# Patient Record
Sex: FEMALE | Race: WHITE | NOT HISPANIC OR LATINO | Employment: FULL TIME | URBAN - METROPOLITAN AREA
[De-identification: names, ages, dates, MRNs, and addresses within clinical notes are randomized per-mention and may not be internally consistent; named-entity substitution may affect disease eponyms.]

---

## 2020-09-23 ENCOUNTER — INITIAL CONSULT (OUTPATIENT)
Dept: INFECTIOUS DISEASES | Facility: CLINIC | Age: 50
End: 2020-09-23
Payer: COMMERCIAL

## 2020-09-23 VITALS
OXYGEN SATURATION: 100 % | HEIGHT: 62 IN | SYSTOLIC BLOOD PRESSURE: 113 MMHG | DIASTOLIC BLOOD PRESSURE: 73 MMHG | HEART RATE: 79 BPM | WEIGHT: 122 LBS | BODY MASS INDEX: 22.45 KG/M2 | TEMPERATURE: 97 F

## 2020-09-23 DIAGNOSIS — H69.93 DYSFUNCTION OF BOTH EUSTACHIAN TUBES: ICD-10-CM

## 2020-09-23 DIAGNOSIS — J30.2 SEASONAL ALLERGIC RHINITIS, UNSPECIFIED TRIGGER: Primary | ICD-10-CM

## 2020-09-23 DIAGNOSIS — R51.9 SINUS HEADACHE: ICD-10-CM

## 2020-09-23 PROCEDURE — 99244 PR OFFICE CONSULTATION,LEVEL IV: ICD-10-PCS | Mod: S$GLB,,, | Performed by: INTERNAL MEDICINE

## 2020-09-23 PROCEDURE — 99244 OFF/OP CNSLTJ NEW/EST MOD 40: CPT | Mod: S$GLB,,, | Performed by: INTERNAL MEDICINE

## 2020-09-23 RX ORDER — AZELASTINE 1 MG/ML
1 SPRAY, METERED NASAL 2 TIMES DAILY
Qty: 30 ML | Refills: 2 | Status: SHIPPED | OUTPATIENT
Start: 2020-09-23 | End: 2021-09-23

## 2020-09-23 NOTE — PATIENT INSTRUCTIONS
I would encourage use of astelin right now to reduce drainage and sore throat and eustachian tube dysfunction.  You can use astelin twice a day  Irrigate your sinuses at least twice a day to remove allergens  Let the nasacort run down the back of your throat so that it can run over eustachian tube openings  Sudafed and also reduce ear stuffiness and headache/facial pain    Get your flu shot at work.  I would also encourage you to get the Prevnar vaccine (pneumonia vaccine) and the pneumovax at least 6 weeks later.   Records from Dr. Delaney    If you develop fever, lots of green/yellow drainage or the pain becomes localized, call me

## 2020-09-23 NOTE — PROGRESS NOTES
"Subjective:      Reason for consult: chronic sinusitis    HPI: Margaret Marquez is a 50 y.o. female   For the past several years she has had chronic sinus infections(7-8). Treated with numerous(50) antibiotics and steroids. Element of allergies contributing to this.   Now sees a sinus specialist , Dr. Varela in [place], starting 2 years ago. 18 months ago, he aspirated from the sinuses and cultured Staph aureus.   Now she has Staph epidermidis culture 4/13/20, and normal shraddha on 8/13(no purulence identified on endoscopic exam on either occasion per ENT notes),  both of which were obtained from the maxillary sinus itself.  This year, she had 3 days of rocephin(in the spring) and once for sinus infection with tetracycline for Staph epi in 4/2020  Tetracycline 2 weeks ago, plus a steroid shot for impetigo, urgent care. Followed up with a dermatologist  Currently she uses nasacort and po antihistamine and no longer using astelin. She does not irrigate her sinuses.   She has had 2 sinus surgeries    She feels that 5-6 days ago, after the weather change, she began to feel run down, facial pain, no fever, minimal yellowish green nasal drainage and pain in her maxillary area, stuffy in ears and pressure between her eyes. Also has toothache on all upper teeth("they feel itchy").  She feels this is reflective of acute sinusitis.     Seen by Dr. Delaney in [place], but has not finished allergy shots due to travel and cost. Allergic to many things, environmental (inside and outside), no pets in the home  She had immune function work up and that was normal     Review of patient's allergies indicates:   Allergen Reactions    Codeine Nausea And Vomiting    Sulfa (sulfonamide antibiotics)      Facial swelling      Clindamycin      UPSET STOMACH     Past Medical History:   Diagnosis Date    Dental abscess 2018    Depression     GERD (gastroesophageal reflux disease)     Sinusitis, chronic      Past Surgical " "History:   Procedure Laterality Date    SINUS SURGERY  1996    SINUS SURGERY  2015    Dr. Buck    TUBAL LIGATION        Social History     Tobacco Use    Smoking status: Never Smoker    Smokeless tobacco: Never Used   Substance Use Topics    Alcohol use: No     History reviewed. No pertinent family history.    Pertinent medications noted:     Review of Systems    Ability to give history is: excellent  No chills, fever, sweats,   No change in vision, loss of vision or diplopia    sinus congestion, minimal purulent nasal discharge, post nasal drip, and facial pain  No pain in mouth but upper teeth are uncomfortable. with minimal discomfort in  throat.    No chest pain, palpitations, syncope  Mild cough, mostly after reclining. no sputum production, shortness of breath, dyspnea on exertion,    No nausea, vomiting, diarrhea  or focal abd pain.      Frontal and facial headaches, no dizziness, vertigo,   No anxiety, depression, substance abuse, sleep disturbance  No diabetes, thyroid, hypogonadal conditions  No bleeding, lymphadenopathy, anemia, malignancy, unusual bruising  No new rashes,           Objective:      Blood pressure 113/73, pulse 79, temperature 97.4 °F (36.3 °C), temperature source Temporal, height 5' 2" (1.575 m), weight 55.3 kg (122 lb), SpO2 100 %. Body mass index is 22.31 kg/m².  Physical Exam      General: Alert and attentive, cooperative and in no distress, but a little anxious    Eyes: Pupils equal, round, reactive to light, anicteric, EOMI    Neck: Supple, non-tender, no thyromegaly or masses    ENT: EAC patent, TM normal, nares patent, with non-edematous turbinates and nasal passages wide open with no purulence.  no oral lesions, teeth in good condition, no thrush, no pharyngitis.     Cardiovascular: Regular rate and rhythm, no murmurs, rubs, or gallop    Respiratory: Lungs clear without wheezes, rales, rubs or rhonci    Gastrointestinal:  Soft, bowel sounds normal,       Genitourinary:  "   Integumentary: Skin without rashes  Vascular: No peripheral edema or phlebitis, warm and well perfused  Musculoskeletal: Ambulates without difficulty, no acute arthritis, synovitis   Lymphatic: No cervical, axillary,LAD  Neurological: Normal LOC, cranial nerves, speech,, gait  Psychiatric: Normal mood, speech,  demeanor   Wound:  VAD:       General Labs reviewed:  Lab Results   Component Value Date    WBC 7.66 01/02/2016    RBC 4.83 01/02/2016    HGB 15.1 01/02/2016    HCT 43.9 01/02/2016    MCV 91 01/02/2016    MCH 31.3 (H) 01/02/2016    MCHC 34.4 01/02/2016    RDW 12.3 01/02/2016     01/02/2016    MPV 9.6 01/02/2016    GRAN 5.0 01/02/2016    GRAN 66.0 01/02/2016    LYMPH 2.0 01/02/2016    LYMPH 25.6 01/02/2016    MONO 0.6 01/02/2016    MONO 7.6 01/02/2016    EOS 0.1 01/02/2016    BASO 0.02 01/02/2016    EOSINOPHIL 0.8 01/02/2016    BASOPHIL 0.3 01/02/2016     CMP  Sodium   Date Value Ref Range Status   01/02/2016 142 136 - 145 mmol/L Final     Potassium   Date Value Ref Range Status   01/02/2016 3.9 3.5 - 5.1 mmol/L Final     Chloride   Date Value Ref Range Status   01/02/2016 101 95 - 110 mmol/L Final     CO2   Date Value Ref Range Status   01/02/2016 26 23 - 29 mmol/L Final     Glucose   Date Value Ref Range Status   01/02/2016 82 70 - 110 mg/dL Final     BUN, Bld   Date Value Ref Range Status   01/02/2016 13 7 - 18 mg/dL Final     Creatinine   Date Value Ref Range Status   01/02/2016 0.63 0.50 - 1.40 mg/dL Final     Calcium   Date Value Ref Range Status   01/02/2016 9.7 8.7 - 10.5 mg/dL Final     Total Protein   Date Value Ref Range Status   01/02/2016 8.6 (H) 6.0 - 8.4 g/dL Final     Albumin   Date Value Ref Range Status   01/02/2016 5.2 3.5 - 5.2 g/dL Final     Total Bilirubin   Date Value Ref Range Status   01/02/2016 0.6 0.1 - 1.0 mg/dL Final     Comment:     For infants and newborns, interpretation of results should be based  on gestational age, weight and in agreement with  clinical  observations.  Premature Infant recommended reference ranges:  Up to 24 hours.............<8.0 mg/dL  Up to 48 hours............<12.0 mg/dL  3-5 days..................<15.0 mg/dL  6-29 days.................<15.0 mg/dL       Alkaline Phosphatase   Date Value Ref Range Status   01/02/2016 54 38 - 145 U/L Final     AST ([place])   Date Value Ref Range Status   01/02/2016 24 14 - 36 U/L Final     ALT   Date Value Ref Range Status   01/02/2016 25 10 - 44 U/L Final     Anion Gap   Date Value Ref Range Status   01/02/2016 15 8 - 16 mmol/L Final     eGFR if    Date Value Ref Range Status   01/02/2016 >60 >60 mL/min/1.73 m^2 Final     eGFR if non    Date Value Ref Range Status   01/02/2016 >60 >60 mL/min/1.73 m^2 Final     Comment:     Calculation used to obtain the estimated glomerular filtration  rate (eGFR) is the CKD-EPI equation. Since race is unknown   in our information system, the eGFR values for   -American and Non--American patients are given   for each creatinine result.             Micro: reviewed available micro in Epic    Imaging Reviewed:          Assessment:       Problem List Items Addressed This Visit     None      Visit Diagnoses     Seasonal allergic rhinitis, unspecified trigger    -  Primary    Sinus headache        Dysfunction of both eustachian tubes               Plan:           Seasonal allergic rhinitis, unspecified trigger    Sinus headache    Dysfunction of both eustachian tubes    Other orders  -     azelastine (ASTELIN) 137 mcg (0.1 %) nasal spray; 1 spray (137 mcg total) by Nasal route 2 (two) times daily.  Dispense: 30 mL; Refill: 2    I explained that I believe she is more likely to respond to allergy meds than antibiotics. She appreciates that using less antibiotics is beneficial in regards to resistance and side effects. She has no physical exam evidence of infection. I do not believe her home regimen is adequate at this time  to control her symptoms. She has not been compliant with all of the elements of her allergy control plan nor her allergy desensitization prescription.  Discussed that there was no evidence of purulence on recent endoscopic exam and cultures of a sinus without purulence are not relevant.    REcommendations:  I would encourage use of astelin right now to reduce drainage and sore throat and eustachian tube dysfunction.  You can use astelin twice a day  Irrigate your sinuses at least twice a day to remove allergens  Let the nasacort run down the back of your throat so that it can run over eustachian tube openings  Sudafed can also reduce ear stuffiness and headache/facial pain    Get your flu shot at work.  I would also encourage you to get the Prevnar vaccine (pneumonia vaccine) and the pneumovax at least 6 weeks later.   Records from Dr. Castrejon    If you develop fever, lots of green/yellow drainage or the pain becomes localized, call me    This note was created using Dragon voice recognition software that occasionally misinterpreted phrases or words.

## 2021-05-10 ENCOUNTER — PATIENT MESSAGE (OUTPATIENT)
Dept: RESEARCH | Facility: HOSPITAL | Age: 51
End: 2021-05-10

## 2022-01-04 ENCOUNTER — OFFICE VISIT (OUTPATIENT)
Dept: INFECTIOUS DISEASES | Facility: CLINIC | Age: 52
End: 2022-01-04
Payer: COMMERCIAL

## 2022-01-04 VITALS
TEMPERATURE: 98 F | OXYGEN SATURATION: 97 % | BODY MASS INDEX: 23.04 KG/M2 | HEIGHT: 62 IN | DIASTOLIC BLOOD PRESSURE: 58 MMHG | WEIGHT: 125.19 LBS | HEART RATE: 76 BPM | SYSTOLIC BLOOD PRESSURE: 116 MMHG

## 2022-01-04 DIAGNOSIS — J30.2 SEASONAL ALLERGIC RHINITIS, UNSPECIFIED TRIGGER: Primary | ICD-10-CM

## 2022-01-04 PROCEDURE — 3078F DIAST BP <80 MM HG: CPT | Mod: S$GLB,,, | Performed by: STUDENT IN AN ORGANIZED HEALTH CARE EDUCATION/TRAINING PROGRAM

## 2022-01-04 PROCEDURE — 3008F PR BODY MASS INDEX (BMI) DOCUMENTED: ICD-10-PCS | Mod: S$GLB,,, | Performed by: STUDENT IN AN ORGANIZED HEALTH CARE EDUCATION/TRAINING PROGRAM

## 2022-01-04 PROCEDURE — 99214 OFFICE O/P EST MOD 30 MIN: CPT | Mod: S$GLB,,, | Performed by: STUDENT IN AN ORGANIZED HEALTH CARE EDUCATION/TRAINING PROGRAM

## 2022-01-04 PROCEDURE — 3008F BODY MASS INDEX DOCD: CPT | Mod: S$GLB,,, | Performed by: STUDENT IN AN ORGANIZED HEALTH CARE EDUCATION/TRAINING PROGRAM

## 2022-01-04 PROCEDURE — 3078F PR MOST RECENT DIASTOLIC BLOOD PRESSURE < 80 MM HG: ICD-10-PCS | Mod: S$GLB,,, | Performed by: STUDENT IN AN ORGANIZED HEALTH CARE EDUCATION/TRAINING PROGRAM

## 2022-01-04 PROCEDURE — 3074F SYST BP LT 130 MM HG: CPT | Mod: S$GLB,,, | Performed by: STUDENT IN AN ORGANIZED HEALTH CARE EDUCATION/TRAINING PROGRAM

## 2022-01-04 PROCEDURE — 99214 PR OFFICE/OUTPT VISIT, EST, LEVL IV, 30-39 MIN: ICD-10-PCS | Mod: S$GLB,,, | Performed by: STUDENT IN AN ORGANIZED HEALTH CARE EDUCATION/TRAINING PROGRAM

## 2022-01-04 PROCEDURE — 3074F PR MOST RECENT SYSTOLIC BLOOD PRESSURE < 130 MM HG: ICD-10-PCS | Mod: S$GLB,,, | Performed by: STUDENT IN AN ORGANIZED HEALTH CARE EDUCATION/TRAINING PROGRAM

## 2022-01-04 RX ORDER — BENZOCAINE .13; .15; .5; 2 G/100G; G/100G; G/100G; G/100G
2 GEL ORAL DAILY
Qty: 1 G | Refills: 2 | Status: SHIPPED | OUTPATIENT
Start: 2022-01-04 | End: 2022-02-03

## 2022-01-04 NOTE — PROGRESS NOTES
Subjective: Chronic sinusitis        Patient ID: Margaret Marquez is a 51 y.o. female.    Chief Complaint:: chronic sinusitis     HPI   51-year-old woman with history of GERD, and chronic sinusitis status post multiple courses of antibiotics.  Patient follows Dr. Varela in [place], for the last 3 years.  She has had serial sinus aspirates and drainage with prior cultures which grew Staph aureus, Staph epidermidis back in 2020.  She is known to our service last seen in September 2020.   Patient states she had 3 episodes of sinusitis in the last year; received Rocephin for 7 days which she noticed significant improvement.  Completed she is now a course of ciprofloxacin for last sinus culture which grew Proteus mirabilis, pansensitive.  She states she feels her ears are always field, postnasal drip, and persistent allergies.  She uses Afrin over-the-counter.  She reports persistent headache, sinus pressure, and variable mucus discharge on and off, most recent nonpurulent.  She denies fever, chills, nausea, vomit, cough, abdominal pain, dysuria, change in bowel movement, or skin rash.      Review of patient's allergies indicates:   Allergen Reactions    Codeine Nausea And Vomiting    Sulfa (sulfonamide antibiotics)      Facial swelling       Past Medical History:   Diagnosis Date    Dental abscess 2018    Depression     GERD (gastroesophageal reflux disease)     Sinusitis, chronic      Past Surgical History:   Procedure Laterality Date    SINUS SURGERY  1996    SINUS SURGERY  2015    Dr. Buck    TUBAL LIGATION       Social History     Tobacco Use    Smoking status: Never Smoker    Smokeless tobacco: Never Used   Substance Use Topics    Alcohol use: No        History reviewed. No pertinent family history.      Review of Systems   Constitutional: Negative for chills and fever.   HENT: Positive for postnasal drip, rhinorrhea, sinus pressure and sinus pain.    Respiratory: Negative for shortness of  "breath.    Cardiovascular: Negative for chest pain.   Gastrointestinal: Negative for abdominal pain, diarrhea and nausea.   Genitourinary: Negative for dysuria.   Musculoskeletal: Negative for back pain.   Skin: Negative for rash.   Neurological: Negative for headaches.           Objective:      Blood pressure (!) 116/58, pulse 76, temperature 98.4 °F (36.9 °C), height 5' 2" (1.575 m), weight 56.8 kg (125 lb 3.2 oz), SpO2 97 %. Body mass index is 22.9 kg/m².  Physical Exam  Constitutional:       Appearance: Normal appearance.   HENT:      Head: Normocephalic.      Right Ear: Tympanic membrane normal.      Left Ear: Tympanic membrane normal.      Ears:      Comments: Tympanic membranes clear, no evidence of acute inflammation     Nose: Nose normal.      Mouth/Throat:      Mouth: Mucous membranes are moist.      Pharynx: Oropharynx is clear.   Cardiovascular:      Rate and Rhythm: Normal rate and regular rhythm.      Pulses: Normal pulses.      Heart sounds: Normal heart sounds.   Pulmonary:      Effort: Pulmonary effort is normal.      Breath sounds: Normal breath sounds.   Abdominal:      General: Bowel sounds are normal.      Palpations: Abdomen is soft.      Tenderness: There is no guarding.   Musculoskeletal:         General: Normal range of motion.      Cervical back: Normal range of motion and neck supple.      Right lower leg: No edema.      Left lower leg: No edema.   Skin:     General: Skin is warm.      Findings: No rash.   Neurological:      General: No focal deficit present.      Mental Status: She is alert and oriented to person, place, and time.   Psychiatric:         Thought Content: Thought content normal.      Recent Diagnostics:  Sinus culture 12/15 grew Proteus mirabilis sensitive to quinolones, resistant to ampicillin and cefazolin. Report scanned and available on Media tab.     Assessment and Plan:         1. Chronic sinusitis, likely seasonal allergic   Patient has completed multiple courses of " oral and IV antibiotics.    Explained at length lower likelihood/benefit of prolonging antibiotic treatment at the moment   Encouraged to use intranasal steroids once a day for now   Referred back to Dr. Varela, ENT for for excision and tissue biopsy including bacterial fungal cultures and special stains   Follow-up in 1 month    This note was created using Dragon voice recognition software that occasionally misinterpreted phrases or words.

## 2022-03-15 ENCOUNTER — OFFICE VISIT (OUTPATIENT)
Dept: INFECTIOUS DISEASES | Facility: CLINIC | Age: 52
End: 2022-03-15
Payer: COMMERCIAL

## 2022-03-15 VITALS
TEMPERATURE: 98 F | SYSTOLIC BLOOD PRESSURE: 102 MMHG | HEART RATE: 85 BPM | WEIGHT: 125.38 LBS | OXYGEN SATURATION: 98 % | HEIGHT: 62 IN | DIASTOLIC BLOOD PRESSURE: 58 MMHG | BODY MASS INDEX: 23.07 KG/M2

## 2022-03-15 DIAGNOSIS — Z87.09 HISTORY OF SINUSITIS: ICD-10-CM

## 2022-03-15 PROCEDURE — 99214 PR OFFICE/OUTPT VISIT, EST, LEVL IV, 30-39 MIN: ICD-10-PCS | Mod: S$GLB,,, | Performed by: STUDENT IN AN ORGANIZED HEALTH CARE EDUCATION/TRAINING PROGRAM

## 2022-03-15 PROCEDURE — 99214 OFFICE O/P EST MOD 30 MIN: CPT | Mod: S$GLB,,, | Performed by: STUDENT IN AN ORGANIZED HEALTH CARE EDUCATION/TRAINING PROGRAM

## 2022-03-15 NOTE — PROGRESS NOTES
Subjective: Chronic sinusitis        Patient ID: Margaret Marquez is a 51 y.o. female.    Chief Complaint:: Follow-up    Follow-up  Pertinent negatives include no abdominal pain, chest pain, chills, fever, headaches, nausea or rash.      51-year-old woman with history of GERD, and chronic sinusitis status post multiple courses of antibiotics.  Patient follows Dr. Varela in [place], for the last 3 years.  She has had serial sinus aspirates and drainage with prior cultures which grew Staph aureus, Staph epidermidis back in 2020.  She is known to our service last seen in September 2020.   Patient states she had 3 episodes of sinusitis in the last year; received Rocephin for 7 days which she noticed significant improvement.  Completed she is now a course of ciprofloxacin for last sinus culture which grew Proteus mirabilis, pansensitive.  She states she feels her ears are always field, postnasal drip, and persistent allergies.  She uses Afrin over-the-counter.  She reports persistent headache, sinus pressure, and variable mucus discharge on and off, most recent nonpurulent.  She denies fever, chills, nausea, vomit, cough, abdominal pain, dysuria, change in bowel movement, or skin rash.    3/15/22:  Interim reviewed, patient is here for follow-up.  She saw Dr. Lopes in [place] in February.  Status post left nasal curettage on 02/17.  Pathology report reviewed, no evidence of active infection, negative for malignancy.  Evidence of degenerative debris, AFB and GMS stains negative.      Review of patient's allergies indicates:   Allergen Reactions    Codeine Nausea And Vomiting    Sulfa (sulfonamide antibiotics)      Facial swelling       Past Medical History:   Diagnosis Date    Dental abscess 2018    Depression     GERD (gastroesophageal reflux disease)     Sinusitis, chronic      Past Surgical History:   Procedure Laterality Date    SINUS SURGERY  1996    SINUS SURGERY  2015    Dr. Heber DOWNS  "LIGATION       Social History     Tobacco Use    Smoking status: Never Smoker    Smokeless tobacco: Never Used   Substance Use Topics    Alcohol use: No        History reviewed. No pertinent family history.      Review of Systems   Constitutional: Negative for chills and fever.   HENT: Positive for postnasal drip, rhinorrhea, sinus pressure and sinus pain.    Respiratory: Negative for shortness of breath.    Cardiovascular: Negative for chest pain.   Gastrointestinal: Negative for abdominal pain, diarrhea and nausea.   Genitourinary: Negative for dysuria.   Musculoskeletal: Negative for back pain.   Skin: Negative for rash.   Neurological: Negative for headaches.           Objective:      Blood pressure (!) 102/58, pulse 85, temperature 98.4 °F (36.9 °C), height 5' 2" (1.575 m), weight 56.9 kg (125 lb 6.4 oz), SpO2 98 %. Body mass index is 22.94 kg/m².  Physical Exam  Constitutional:       Appearance: Normal appearance.   HENT:      Head: Normocephalic.      Right Ear: Tympanic membrane normal.      Left Ear: Tympanic membrane normal.      Ears:      Comments: Tympanic membranes clear, no evidence of acute inflammation     Nose: Nose normal.      Mouth/Throat:      Mouth: Mucous membranes are moist.      Pharynx: Oropharynx is clear.   Cardiovascular:      Rate and Rhythm: Normal rate and regular rhythm.      Pulses: Normal pulses.      Heart sounds: Normal heart sounds.   Pulmonary:      Effort: Pulmonary effort is normal.      Breath sounds: Normal breath sounds.   Abdominal:      General: Bowel sounds are normal.      Palpations: Abdomen is soft.      Tenderness: There is no guarding.   Musculoskeletal:         General: Normal range of motion.      Cervical back: Normal range of motion and neck supple.      Right lower leg: No edema.      Left lower leg: No edema.   Skin:     General: Skin is warm.      Findings: No rash.   Neurological:      General: No focal deficit present.      Mental Status: She is alert " and oriented to person, place, and time.   Psychiatric:         Thought Content: Thought content normal.      Recent Diagnostics:  Sinus culture 12/15 grew Proteus mirabilis sensitive to quinolones, resistant to ampicillin and cefazolin. Report scanned and available on Media tab.    Pathology from 2/23 available in Media - no evidence of infection or malignancy.      Assessment and Plan:         1. Chronic sinusitis, likely seasonal allergic, resolved after ENT curettage - improved    Continue supportive care; intranasal is a steroids, saline   All questions answered   Follow-up as needed     2. Bp 102/58   Patient complaining of persistent low diastolic BP, she will follow up with PCP in 2 weeks.      This note was created using Dragon voice recognition software that occasionally misinterpreted phrases or words.

## 2024-08-04 ENCOUNTER — PATIENT MESSAGE (OUTPATIENT)
Dept: INFECTIOUS DISEASES | Facility: CLINIC | Age: 54
End: 2024-08-04
Payer: COMMERCIAL

## 2024-08-21 ENCOUNTER — LAB VISIT (OUTPATIENT)
Dept: LAB | Facility: HOSPITAL | Age: 54
End: 2024-08-21
Attending: STUDENT IN AN ORGANIZED HEALTH CARE EDUCATION/TRAINING PROGRAM
Payer: COMMERCIAL

## 2024-08-21 ENCOUNTER — OFFICE VISIT (OUTPATIENT)
Dept: INFECTIOUS DISEASES | Facility: CLINIC | Age: 54
End: 2024-08-21
Payer: COMMERCIAL

## 2024-08-21 VITALS
DIASTOLIC BLOOD PRESSURE: 72 MMHG | TEMPERATURE: 97 F | BODY MASS INDEX: 23.96 KG/M2 | WEIGHT: 130.19 LBS | SYSTOLIC BLOOD PRESSURE: 106 MMHG | HEART RATE: 82 BPM | HEIGHT: 62 IN

## 2024-08-21 DIAGNOSIS — J32.1 CHRONIC FRONTAL SINUSITIS: ICD-10-CM

## 2024-08-21 DIAGNOSIS — J34.89 OTHER SPECIFIED DISORDERS OF NOSE AND NASAL SINUSES: ICD-10-CM

## 2024-08-21 DIAGNOSIS — Z87.09 HISTORY OF SINUSITIS: ICD-10-CM

## 2024-08-21 DIAGNOSIS — Z87.09 HISTORY OF SINUSITIS: Primary | ICD-10-CM

## 2024-08-21 LAB
ALBUMIN SERPL BCP-MCNC: 4.8 G/DL (ref 3.5–5.2)
ALP SERPL-CCNC: 40 U/L (ref 55–135)
ALT SERPL W/O P-5'-P-CCNC: 15 U/L (ref 10–44)
ANION GAP SERPL CALC-SCNC: 7 MMOL/L (ref 8–16)
AST SERPL-CCNC: 17 U/L (ref 10–40)
BASOPHILS # BLD AUTO: 0.03 K/UL (ref 0–0.2)
BASOPHILS NFR BLD: 0.4 % (ref 0–1.9)
BILIRUB SERPL-MCNC: 0.3 MG/DL (ref 0.1–1)
BUN SERPL-MCNC: 11 MG/DL (ref 6–20)
CALCIUM SERPL-MCNC: 8.9 MG/DL (ref 8.7–10.5)
CHLORIDE SERPL-SCNC: 103 MMOL/L (ref 95–110)
CO2 SERPL-SCNC: 27 MMOL/L (ref 23–29)
CREAT SERPL-MCNC: 0.7 MG/DL (ref 0.5–1.4)
CRP SERPL-MCNC: 0.1 MG/DL
DIFFERENTIAL METHOD BLD: ABNORMAL
EOSINOPHIL # BLD AUTO: 0.2 K/UL (ref 0–0.5)
EOSINOPHIL NFR BLD: 2.5 % (ref 0–8)
ERYTHROCYTE [DISTWIDTH] IN BLOOD BY AUTOMATED COUNT: 12.3 % (ref 11.5–14.5)
ERYTHROCYTE [SEDIMENTATION RATE] IN BLOOD BY WESTERGREN METHOD: 1 MM/HR (ref 0–20)
EST. GFR  (NO RACE VARIABLE): >60 ML/MIN/1.73 M^2
GLUCOSE SERPL-MCNC: 80 MG/DL (ref 70–110)
HCT VFR BLD AUTO: 40.9 % (ref 37–48.5)
HGB BLD-MCNC: 13.7 G/DL (ref 12–16)
IMM GRANULOCYTES # BLD AUTO: 0.01 K/UL (ref 0–0.04)
IMM GRANULOCYTES NFR BLD AUTO: 0.1 % (ref 0–0.5)
LYMPHOCYTES # BLD AUTO: 2.3 K/UL (ref 1–4.8)
LYMPHOCYTES NFR BLD: 31.6 % (ref 18–48)
MCH RBC QN AUTO: 31.4 PG (ref 27–31)
MCHC RBC AUTO-ENTMCNC: 33.5 G/DL (ref 32–36)
MCV RBC AUTO: 94 FL (ref 82–98)
MONOCYTES # BLD AUTO: 0.8 K/UL (ref 0.3–1)
MONOCYTES NFR BLD: 11.6 % (ref 4–15)
NEUTROPHILS # BLD AUTO: 3.9 K/UL (ref 1.8–7.7)
NEUTROPHILS NFR BLD: 53.8 % (ref 38–73)
NRBC BLD-RTO: 0 /100 WBC
PLATELET # BLD AUTO: 254 K/UL (ref 150–450)
PMV BLD AUTO: 9.2 FL (ref 9.2–12.9)
POTASSIUM SERPL-SCNC: 3.8 MMOL/L (ref 3.5–5.1)
PROT SERPL-MCNC: 7.6 G/DL (ref 6–8.4)
RBC # BLD AUTO: 4.37 M/UL (ref 4–5.4)
SODIUM SERPL-SCNC: 137 MMOL/L (ref 136–145)
WBC # BLD AUTO: 7.25 K/UL (ref 3.9–12.7)

## 2024-08-21 PROCEDURE — 3074F SYST BP LT 130 MM HG: CPT | Mod: CPTII,S$GLB,, | Performed by: STUDENT IN AN ORGANIZED HEALTH CARE EDUCATION/TRAINING PROGRAM

## 2024-08-21 PROCEDURE — 85651 RBC SED RATE NONAUTOMATED: CPT | Performed by: STUDENT IN AN ORGANIZED HEALTH CARE EDUCATION/TRAINING PROGRAM

## 2024-08-21 PROCEDURE — 3008F BODY MASS INDEX DOCD: CPT | Mod: CPTII,S$GLB,, | Performed by: STUDENT IN AN ORGANIZED HEALTH CARE EDUCATION/TRAINING PROGRAM

## 2024-08-21 PROCEDURE — 85025 COMPLETE CBC W/AUTO DIFF WBC: CPT | Performed by: STUDENT IN AN ORGANIZED HEALTH CARE EDUCATION/TRAINING PROGRAM

## 2024-08-21 PROCEDURE — 36415 COLL VENOUS BLD VENIPUNCTURE: CPT | Performed by: STUDENT IN AN ORGANIZED HEALTH CARE EDUCATION/TRAINING PROGRAM

## 2024-08-21 PROCEDURE — 1159F MED LIST DOCD IN RCRD: CPT | Mod: CPTII,S$GLB,, | Performed by: STUDENT IN AN ORGANIZED HEALTH CARE EDUCATION/TRAINING PROGRAM

## 2024-08-21 PROCEDURE — 3078F DIAST BP <80 MM HG: CPT | Mod: CPTII,S$GLB,, | Performed by: STUDENT IN AN ORGANIZED HEALTH CARE EDUCATION/TRAINING PROGRAM

## 2024-08-21 PROCEDURE — 86140 C-REACTIVE PROTEIN: CPT | Performed by: STUDENT IN AN ORGANIZED HEALTH CARE EDUCATION/TRAINING PROGRAM

## 2024-08-21 PROCEDURE — 99214 OFFICE O/P EST MOD 30 MIN: CPT | Mod: S$GLB,,, | Performed by: STUDENT IN AN ORGANIZED HEALTH CARE EDUCATION/TRAINING PROGRAM

## 2024-08-21 PROCEDURE — 80053 COMPREHEN METABOLIC PANEL: CPT | Performed by: STUDENT IN AN ORGANIZED HEALTH CARE EDUCATION/TRAINING PROGRAM

## 2024-08-21 NOTE — PROGRESS NOTES
Chronic sinusitis        Patient ID: Margaret Marquez is a 54 y.o. female.    Chief Complaint:: possible sinus infection     Follow-up  Associated symptoms include coughing, fatigue, headaches and weakness. Pertinent negatives include no abdominal pain, chest pain, chills, fever, nausea or rash.      54-year-old woman with history of GERD, and chronic sinusitis status post multiple courses of antibiotics.  Patient follows Dr. Varela in [place], for the last 3 years.  She has had serial sinus aspirates and drainage with prior cultures which grew Staph aureus, Staph epidermidis back in 2020.  She is known to our service last seen in September 2020.   Patient states she had 3 episodes of sinusitis in the last year; received Rocephin for 7 days which she noticed significant improvement.  Completed she is now a course of ciprofloxacin for last sinus culture which grew Proteus mirabilis, pansensitive.  She states she feels her ears are always field, postnasal drip, and persistent allergies.  She uses Afrin over-the-counter.  She reports persistent headache, sinus pressure, and variable mucus discharge on and off, most recent nonpurulent.  She denies fever, chills, nausea, vomit, cough, abdominal pain, dysuria, change in bowel movement, or skin rash.    3/15/22:  Interim reviewed, patient is here for follow-up.  She saw Dr. Varela in [place] in February.  Status post left nasal curettage on 02/17.  Pathology report reviewed, no evidence of active infection, negative for malignancy.  Evidence of degenerative debris, AFB and GMS stains negative.    8/21: Patient is here for follow up, she called the office to schedule an appt because she is very concerned she might have recurrent sinus infection. She has been seen multiple times by ENT in [place], and has received ceftriaxone 4 times in the last 11 months for 5 days each. She continuous to have headache, post nasal discharge and feeling her sinuses filled  with fluid in addition to dry cough.  She denies fever, chills, or night sweats. No nausea or vomit. No chest pain or abdominal pain, no other symptoms.  Discussed with patient at length pros and cons of being on antibiotics on and off without clear evidence of infection, high-risk of C diff and unlikely contributing to antibiotic resistance.  She understands, she mentioned she had a Gram stain positive for GPCs, cultures no growth.  Explained this is stain that the text bacteria but if there was no growth on cultures there was no evidence of infection.  She mentioned she was offered a special surgery to no gout and nerve and decrease the amount of secretions, she will contact her ENT and discuss further.  For now, we will get a CT sinuses with IV contrast to rule out acute pathology and assess status of sinuses.  She will see ENT, explained at length and ask to contact the office once cultures are taken so we can follow and guide therapy if needed.      Review of patient's allergies indicates:   Allergen Reactions    Codeine Nausea And Vomiting    Sulfa (sulfonamide antibiotics)      Facial swelling       Past Medical History:   Diagnosis Date    Dental abscess 2018    Depression     GERD (gastroesophageal reflux disease)     Sinusitis, chronic      Past Surgical History:   Procedure Laterality Date    SINUS SURGERY  1996    SINUS SURGERY  2015    Dr. Buck    TUBAL LIGATION       Social History     Tobacco Use    Smoking status: Never    Smokeless tobacco: Never   Substance Use Topics    Alcohol use: No        No family history on file.      Review of Systems   Constitutional:  Positive for fatigue. Negative for chills and fever.   HENT:  Positive for postnasal drip, rhinorrhea, sinus pressure and sinus pain.    Respiratory:  Positive for cough. Negative for shortness of breath.    Cardiovascular:  Negative for chest pain.   Gastrointestinal:  Negative for abdominal pain, diarrhea and nausea.   Genitourinary:   "Negative for dysuria.   Musculoskeletal:  Negative for back pain.   Skin:  Negative for rash.   Neurological:  Positive for weakness and headaches.           Objective:      Blood pressure 106/72, pulse 82, temperature 97.4 °F (36.3 °C), height 5' 2" (1.575 m), weight 59.1 kg (130 lb 3.2 oz), SpO2 (P) 97%. Body mass index is 23.81 kg/m².  Physical Exam  Constitutional:       Appearance: Normal appearance.   HENT:      Head: Normocephalic.      Comments: Sinus pain frontal and ethmoidal bilaterally     Right Ear: Tympanic membrane normal.      Left Ear: Tympanic membrane normal.      Ears:      Comments: Tympanic membranes clear, no evidence of acute inflammation     Nose: Nose normal.      Mouth/Throat:      Mouth: Mucous membranes are moist.      Pharynx: Oropharynx is clear.   Cardiovascular:      Rate and Rhythm: Normal rate and regular rhythm.      Pulses: Normal pulses.      Heart sounds: Normal heart sounds.   Pulmonary:      Effort: Pulmonary effort is normal.      Breath sounds: Normal breath sounds.   Abdominal:      General: Bowel sounds are normal.      Palpations: Abdomen is soft.      Tenderness: There is no guarding.   Musculoskeletal:         General: Normal range of motion.      Cervical back: Normal range of motion and neck supple.      Right lower leg: No edema.      Left lower leg: No edema.   Skin:     General: Skin is warm.      Findings: No rash.   Neurological:      General: No focal deficit present.      Mental Status: She is alert and oriented to person, place, and time.   Psychiatric:         Thought Content: Thought content normal.      Recent Diagnostics:  Sinus culture 12/15 grew Proteus mirabilis sensitive to quinolones, resistant to ampicillin and cefazolin. Report scanned and available on Media tab.    Pathology from 2/23 available in [facility] - no evidence of infection or malignancy.      Assessment and Plan:         1. History of chronic sinusitis, rule out seasonal allergies, status " post Rocephin IM/IV x4 in the last 11 months - stable   Labs today   As per patient, no organisms have been identified   Will order CT sinuses with IV contrast to rule out acute pathology   Patient advised to contact ENT whenever she feels infection present for curettage and cultures in order to guide antibiotic therapy if there is evidence of infection   Continue supportive care; intranasal steroids, saline   All questions answered   RTC in 5 months       I spent a total of 30 minutes on the day of the visit.  This includes face to face time and non-face to face time preparing to see the patient (eg, review of tests), obtaining and/or reviewing separately obtained history, documenting clinical information in the electronic or other health record, independently interpreting results and communicating results to the patient/family/caregiver, or care coordinator.    This note was created using Dragon voice recognition software that occasionally misinterpreted phrases or words.

## 2024-08-29 ENCOUNTER — HOSPITAL ENCOUNTER (OUTPATIENT)
Dept: RADIOLOGY | Facility: HOSPITAL | Age: 54
Discharge: HOME | End: 2024-08-29
Attending: STUDENT IN AN ORGANIZED HEALTH CARE EDUCATION/TRAINING PROGRAM
Payer: COMMERCIAL

## 2024-08-29 DIAGNOSIS — J32.1 CHRONIC FRONTAL SINUSITIS: ICD-10-CM

## 2024-08-29 DIAGNOSIS — J34.89 OTHER SPECIFIED DISORDERS OF NOSE AND NASAL SINUSES: ICD-10-CM

## 2024-08-29 PROCEDURE — 70487 CT MAXILLOFACIAL W/DYE: CPT | Mod: 26,,, | Performed by: RADIOLOGY

## 2024-08-29 PROCEDURE — 70487 CT MAXILLOFACIAL W/DYE: CPT | Mod: TC,PO

## 2024-08-29 PROCEDURE — 25500020 PHARM REV CODE 255: Mod: PO | Performed by: STUDENT IN AN ORGANIZED HEALTH CARE EDUCATION/TRAINING PROGRAM

## 2024-08-29 RX ADMIN — IOHEXOL 100 ML: 350 INJECTION, SOLUTION INTRAVENOUS at 03:08

## 2025-03-26 ENCOUNTER — OFFICE VISIT (OUTPATIENT)
Dept: INFECTIOUS DISEASES | Facility: CLINIC | Age: 55
End: 2025-03-26
Payer: COMMERCIAL

## 2025-03-26 VITALS
TEMPERATURE: 97 F | HEIGHT: 62 IN | BODY MASS INDEX: 22.15 KG/M2 | DIASTOLIC BLOOD PRESSURE: 60 MMHG | WEIGHT: 120.38 LBS | OXYGEN SATURATION: 96 % | SYSTOLIC BLOOD PRESSURE: 100 MMHG

## 2025-03-26 DIAGNOSIS — Z87.09 HISTORY OF SINUSITIS: ICD-10-CM

## 2025-03-26 DIAGNOSIS — G43.801 OTHER MIGRAINE WITH STATUS MIGRAINOSUS, NOT INTRACTABLE: ICD-10-CM

## 2025-03-26 DIAGNOSIS — J32.1 CHRONIC FRONTAL SINUSITIS: Primary | ICD-10-CM

## 2025-03-26 PROCEDURE — 3074F SYST BP LT 130 MM HG: CPT | Mod: CPTII,S$GLB,, | Performed by: STUDENT IN AN ORGANIZED HEALTH CARE EDUCATION/TRAINING PROGRAM

## 2025-03-26 PROCEDURE — 3008F BODY MASS INDEX DOCD: CPT | Mod: CPTII,S$GLB,, | Performed by: STUDENT IN AN ORGANIZED HEALTH CARE EDUCATION/TRAINING PROGRAM

## 2025-03-26 PROCEDURE — 99214 OFFICE O/P EST MOD 30 MIN: CPT | Mod: S$GLB,,, | Performed by: STUDENT IN AN ORGANIZED HEALTH CARE EDUCATION/TRAINING PROGRAM

## 2025-03-26 PROCEDURE — 1159F MED LIST DOCD IN RCRD: CPT | Mod: CPTII,S$GLB,, | Performed by: STUDENT IN AN ORGANIZED HEALTH CARE EDUCATION/TRAINING PROGRAM

## 2025-03-26 PROCEDURE — 3078F DIAST BP <80 MM HG: CPT | Mod: CPTII,S$GLB,, | Performed by: STUDENT IN AN ORGANIZED HEALTH CARE EDUCATION/TRAINING PROGRAM

## 2025-03-26 RX ORDER — MOXIFLOXACIN HYDROCHLORIDE 400 MG/1
400 TABLET ORAL DAILY
Qty: 10 TABLET | Refills: 0 | Status: SHIPPED | OUTPATIENT
Start: 2025-03-26 | End: 2025-04-05

## 2025-03-26 RX ORDER — PREDNISONE 20 MG/1
20 TABLET ORAL DAILY
Qty: 3 TABLET | Refills: 0 | Status: SHIPPED | OUTPATIENT
Start: 2025-03-26 | End: 2025-03-29

## 2025-03-26 RX ORDER — CIPROFLOXACIN AND DEXAMETHASONE 3; 1 MG/ML; MG/ML
4 SUSPENSION/ DROPS AURICULAR (OTIC) 2 TIMES DAILY
Qty: 7.5 ML | Refills: 1 | Status: SHIPPED | OUTPATIENT
Start: 2025-03-26 | End: 2025-04-02

## 2025-03-26 RX ORDER — DOXYCYCLINE 100 MG/1
100 CAPSULE ORAL 2 TIMES DAILY
Qty: 20 CAPSULE | Refills: 0 | Status: SHIPPED | OUTPATIENT
Start: 2025-03-26 | End: 2025-04-05

## 2025-03-26 RX ORDER — SODIUM CHLORIDE FOR INHALATION 3 %
4 VIAL, NEBULIZER (ML) INHALATION 2 TIMES DAILY
Qty: 80 ML | Refills: 0 | Status: SHIPPED | OUTPATIENT
Start: 2025-03-26 | End: 2025-04-05

## 2025-03-26 NOTE — PATIENT INSTRUCTIONS
CT scan w/ and w/o contrast  Moxifloxacin 400mg PO daily  Doxycycline 100mg PO twice a day   Above for 10 days  Prednsione 20mg PO daily for 3 days  Hypertonic 3% nebulization BID  Labs ordered  ENT follow up for possible culture   Alarm symptoms given to the patient   RTC in 4 weeks

## 2025-03-26 NOTE — PROGRESS NOTES
Chronic sinusitis        Patient ID: Margaret Marquez is a 54 y.o. female.    Chief Complaint:: Follow-up    Follow-up  Associated symptoms include fatigue, headaches and weakness. Pertinent negatives include no abdominal pain, chest pain, chills, coughing, fever, nausea or rash.      54-year-old woman with history of GERD, and chronic sinusitis status post multiple courses of antibiotics.  Patient follows Dr. Varela in [place], for the last 3 years.  She has had serial sinus aspirates and drainage with prior cultures which grew Staph aureus, Staph epidermidis back in 2020.  She is known to our service last seen in September 2020.   Patient states she had 3 episodes of sinusitis in the last year; received Rocephin for 7 days which she noticed significant improvement.  Completed she is now a course of ciprofloxacin for last sinus culture which grew Proteus mirabilis, pansensitive.  She states she feels her ears are always field, postnasal drip, and persistent allergies.  She uses Afrin over-the-counter.  She reports persistent headache, sinus pressure, and variable mucus discharge on and off, most recent nonpurulent.  She denies fever, chills, nausea, vomit, cough, abdominal pain, dysuria, change in bowel movement, or skin rash.    3/15/22:  Interim reviewed, patient is here for follow-up.  She saw Dr. Varela in [place] in February.  Status post left nasal curettage on 02/17.  Pathology report reviewed, no evidence of active infection, negative for malignancy.  Evidence of degenerative debris, AFB and GMS stains negative.    8/21: Patient is here for follow up, she called the office to schedule an appt because she is very concerned she might have recurrent sinus infection. She has been seen multiple times by ENT in [place], and has received ceftriaxone 4 times in the last 11 months for 5 days each. She continuous to have headache, post nasal discharge and feeling her sinuses filled with fluid in  addition to dry cough.  She denies fever, chills, or night sweats. No nausea or vomit. No chest pain or abdominal pain, no other symptoms.  Discussed with patient at length pros and cons of being on antibiotics on and off without clear evidence of infection, high-risk of C diff and unlikely contributing to antibiotic resistance.  She understands, she mentioned she had a Gram stain positive for GPCs, cultures no growth.  Explained this is stain that the text bacteria but if there was no growth on cultures there was no evidence of infection.  She mentioned she was offered a special surgery to no gout and nerve and decrease the amount of secretions, she will contact her ENT and discuss further.  For now, we will get a CT sinuses with IV contrast to rule out acute pathology and assess status of sinuses.  She will see ENT, explained at length and ask to contact the office once cultures are taken so we can follow and guide therapy if needed.    3/26/2025:  Interim reviewed, patient is here for follow-up, she was last seen at the office August of 2024.  He mentions since last visit, she has been seen by allergy specialist, she is currently doing serum infusions once a week since November to enhance her immune system.  She recently saw neurology for worsening headache, she was given a dose of tramadol with partial improvement in symptoms.  She is very concerned she might be having a sinusitis flare due to worsening headache, she feels pressure on her frontal area, has postnasal drip, but she is not able to drain the mucus.  She also mentions she was treated with 6 days of ceftriaxone IM by allergy specialist earlier this year.  She was advised to seek Infectious Disease and ENT for further management.  She also states she went to see ENT, had scope performed and was told there was not purulent secretions and nothing concerning and no cultures were sent.  She complains of occasional night sweats at night, denies fever or  "chills.  She denies chest pain, no shortness of breath, very occasional cough, unable to bring anything up.  No abdominal pain, no dysuria increased urinary frequency, no changes in bowel movements.    Review of patient's allergies indicates:   Allergen Reactions    Codeine Nausea And Vomiting    Sulfa (sulfonamide antibiotics)      Facial swelling       Past Medical History:   Diagnosis Date    Dental abscess 2018    Depression     GERD (gastroesophageal reflux disease)     Sinusitis, chronic      Past Surgical History:   Procedure Laterality Date    SINUS SURGERY  1996    SINUS SURGERY  2015    Dr. Buck    TUBAL LIGATION       Social History     Tobacco Use    Smoking status: Never    Smokeless tobacco: Never   Substance Use Topics    Alcohol use: No        No family history on file.      Review of Systems   Constitutional:  Positive for fatigue. Negative for chills and fever.   HENT:  Positive for postnasal drip, rhinorrhea, sinus pressure and sinus pain.    Respiratory:  Negative for cough and shortness of breath.    Cardiovascular:  Negative for chest pain.   Gastrointestinal:  Negative for abdominal pain, diarrhea and nausea.   Genitourinary:  Negative for dysuria.   Musculoskeletal:  Negative for back pain.   Skin:  Negative for rash.   Neurological:  Positive for weakness and headaches.           Objective:      Blood pressure 100/60, pulse (P) 86, temperature 97.2 °F (36.2 °C), temperature source Temporal, height 5' 2" (1.575 m), weight 54.6 kg (120 lb 6.4 oz), SpO2 96%. Body mass index is 22.02 kg/m².  Physical Exam  Constitutional:       Appearance: Normal appearance.   HENT:      Head: Normocephalic.      Comments: Sinus pain frontal bilaterally     Right Ear: Tympanic membrane normal.      Left Ear: Tympanic membrane normal.      Ears:      Comments: Right ear with fluid noted on tympanic membrane with mild erythema consistent with otitis   Left ear with clear tympanic membrane, no signs of otitis     " Nose: Nose normal.      Comments: Exam with otoscope revealed right nostril with quiet mucosa, no evidence of drainage.  Left nostril with hyperemia, tender to touch, no evidence of purulent drainage     Mouth/Throat:      Mouth: Mucous membranes are moist.      Pharynx: Oropharynx is clear.   Cardiovascular:      Rate and Rhythm: Normal rate and regular rhythm.      Pulses: Normal pulses.      Heart sounds: Normal heart sounds.   Pulmonary:      Effort: Pulmonary effort is normal.      Breath sounds: Normal breath sounds.   Abdominal:      General: Bowel sounds are normal.      Palpations: Abdomen is soft.      Tenderness: There is no guarding.   Musculoskeletal:         General: Normal range of motion.      Cervical back: Normal range of motion and neck supple.      Right lower leg: No edema.      Left lower leg: No edema.   Skin:     General: Skin is warm.      Findings: No rash.   Neurological:      General: No focal deficit present.      Mental Status: She is alert and oriented to person, place, and time.   Psychiatric:         Thought Content: Thought content normal.      Recent Diagnostics:  Sinus culture 12/15 grew Proteus mirabilis sensitive to quinolones, resistant to ampicillin and cefazolin. Report scanned and available on Media tab.    Pathology from 2/23 available in [facility] - no evidence of infection or malignancy.      Assessment and Plan:         1. History of chronic sinusitis, rule out seasonal allergies, has been treated with Rocephin IM/IV in the last 18 months, rule out flare   Labs today    Last CT scan in August of 2024 had a 6 mm and 4 mm rounded polypoid intraluminal filling defect compatible with mucous retention cyst/mucocele vs polyp in the left maxillary sinus.  Right maxillary sinus 3 mm round polypoid intraluminal filling defect along the anterior medial right maxillary sinus wall suggestive of a polyp, or mucous retention cysts/mucocele.  S sphenoid sinuses with mucosal thickening  along the sphenoethmoid recesses measuring 1 mm in thickness.  Will order CT sinuses with IV contrast to rule out acute pathology   Patient advised to contact ENT whenever she feels infection present for curettage and send cultures in order to guide antibiotic therapy if there is evidence of infection   While awaiting evaluation by ENT, moxifloxacin 400 mg p.o. daily, doxycycline 100 mg p.o. twice a day for 7-10 days   Ciprodex right ear twice a day for 7 days  Prednisone 20 mg p.o. daily for 3 days next alarm symptoms given to the patient in case symptoms worsened do go directly to the emergency room   Continue supportive care; intranasal steroids, saline   All questions answered   RTC in 1 month      I spent a total of 30 minutes on the day of the visit.  This includes face to face time and non-face to face time preparing to see the patient (eg, review of tests), obtaining and/or reviewing separately obtained history, documenting clinical information in the electronic or other health record, independently interpreting results and communicating results to the patient/family/caregiver, or care coordinator.    This note was created using Dragon voice recognition software that occasionally misinterpreted phrases or words.

## 2025-03-27 ENCOUNTER — HOSPITAL ENCOUNTER (OUTPATIENT)
Dept: RADIOLOGY | Facility: HOSPITAL | Age: 55
Discharge: HOME | End: 2025-03-27
Attending: STUDENT IN AN ORGANIZED HEALTH CARE EDUCATION/TRAINING PROGRAM
Payer: COMMERCIAL

## 2025-03-27 DIAGNOSIS — J32.1 CHRONIC FRONTAL SINUSITIS: ICD-10-CM

## 2025-03-27 PROCEDURE — 70487 CT MAXILLOFACIAL W/DYE: CPT | Mod: 26,,, | Performed by: RADIOLOGY

## 2025-03-27 PROCEDURE — 25500020 PHARM REV CODE 255: Performed by: STUDENT IN AN ORGANIZED HEALTH CARE EDUCATION/TRAINING PROGRAM

## 2025-03-27 PROCEDURE — 70487 CT MAXILLOFACIAL W/DYE: CPT | Mod: TC

## 2025-03-27 RX ADMIN — IOHEXOL 100 ML: 350 INJECTION, SOLUTION INTRAVENOUS at 11:03

## 2025-03-30 ENCOUNTER — HOSPITAL ENCOUNTER (EMERGENCY)
Facility: HOSPITAL | Age: 55
Discharge: HOME | End: 2025-03-30
Attending: EMERGENCY MEDICINE
Payer: COMMERCIAL

## 2025-03-30 VITALS
HEART RATE: 75 BPM | OXYGEN SATURATION: 99 % | RESPIRATION RATE: 16 BRPM | DIASTOLIC BLOOD PRESSURE: 74 MMHG | TEMPERATURE: 98 F | SYSTOLIC BLOOD PRESSURE: 122 MMHG | HEIGHT: 62 IN | BODY MASS INDEX: 22.08 KG/M2 | WEIGHT: 120 LBS

## 2025-03-30 DIAGNOSIS — J32.9 SINUSITIS, UNSPECIFIED CHRONICITY, UNSPECIFIED LOCATION: ICD-10-CM

## 2025-03-30 DIAGNOSIS — R51.9 SINUS HEADACHE: Primary | ICD-10-CM

## 2025-03-30 PROBLEM — E78.5 HYPERLIPIDEMIA: Status: ACTIVE | Noted: 2024-01-10

## 2025-03-30 LAB
ABSOLUTE EOSINOPHIL (SMH): 0.08 K/UL
ABSOLUTE MONOCYTE (SMH): 0.67 K/UL (ref 0.3–1)
ABSOLUTE NEUTROPHIL COUNT (SMH): 2.1 K/UL (ref 1.8–7.7)
ALBUMIN SERPL-MCNC: 4.4 G/DL (ref 3.5–5.2)
ALP SERPL-CCNC: 45 UNIT/L (ref 55–135)
ALT SERPL-CCNC: 15 UNIT/L (ref 10–44)
ANION GAP (SMH): 8 MMOL/L (ref 8–16)
AST SERPL-CCNC: 15 UNIT/L (ref 10–40)
BASOPHILS # BLD AUTO: 0.03 K/UL
BASOPHILS NFR BLD AUTO: 0.6 %
BILIRUB SERPL-MCNC: 0.4 MG/DL (ref 0.1–1)
BILIRUB UR QL STRIP.AUTO: NEGATIVE
BUN SERPL-MCNC: 13 MG/DL (ref 6–20)
CALCIUM SERPL-MCNC: 9.2 MG/DL (ref 8.7–10.5)
CHLORIDE SERPL-SCNC: 105 MMOL/L (ref 95–110)
CK SERPL-CCNC: 61 U/L (ref 20–180)
CLARITY UR: CLEAR
CO2 SERPL-SCNC: 28 MMOL/L (ref 23–29)
COLOR UR AUTO: YELLOW
CREAT SERPL-MCNC: 0.9 MG/DL (ref 0.5–1.4)
ERYTHROCYTE [DISTWIDTH] IN BLOOD BY AUTOMATED COUNT: 12.4 % (ref 11.5–14.5)
GFR SERPLBLD CREATININE-BSD FMLA CKD-EPI: >60 ML/MIN/1.73/M2
GLUCOSE SERPL-MCNC: 80 MG/DL (ref 70–110)
GLUCOSE UR QL STRIP: NEGATIVE
HCT VFR BLD AUTO: 41.3 % (ref 37–48.5)
HGB BLD-MCNC: 13.9 GM/DL (ref 12–16)
HGB UR QL STRIP: NEGATIVE
IMM GRANULOCYTES # BLD AUTO: 0.01 K/UL (ref 0–0.04)
IMM GRANULOCYTES NFR BLD AUTO: 0.2 % (ref 0–0.5)
INFLUENZA A MOLECULAR (OHS): NEGATIVE
INFLUENZA B MOLECULAR (OHS): NEGATIVE
KETONES UR QL STRIP: NEGATIVE
LDH SERPL L TO P-CCNC: 0.88 MMOL/L (ref 0.5–2.2)
LEUKOCYTE ESTERASE UR QL STRIP: NEGATIVE
LYMPHOCYTES # BLD AUTO: 2.21 K/UL (ref 1–4.8)
MAGNESIUM SERPL-MCNC: 2.1 MG/DL (ref 1.6–2.6)
MCH RBC QN AUTO: 30.9 PG (ref 27–31)
MCHC RBC AUTO-ENTMCNC: 33.7 G/DL (ref 32–36)
MCV RBC AUTO: 92 FL (ref 82–98)
NITRITE UR QL STRIP: NEGATIVE
NUCLEATED RBC (/100WBC) (SMH): 0 /100 WBC
PH UR STRIP: 6 [PH]
PLATELET # BLD AUTO: 324 K/UL (ref 150–450)
PMV BLD AUTO: 8.8 FL (ref 9.2–12.9)
POTASSIUM SERPL-SCNC: 3.7 MMOL/L (ref 3.5–5.1)
PROT SERPL-MCNC: 7 GM/DL (ref 6–8.4)
PROT UR QL STRIP: NEGATIVE
RBC # BLD AUTO: 4.5 M/UL (ref 4–5.4)
RELATIVE EOSINOPHIL (SMH): 1.6 % (ref 0–8)
RELATIVE LYMPHOCYTE (SMH): 43.6 % (ref 18–48)
RELATIVE MONOCYTE (SMH): 13.2 % (ref 4–15)
RELATIVE NEUTROPHIL (SMH): 40.8 % (ref 38–73)
SAMPLE: NORMAL
SARS-COV-2 RDRP RESP QL NAA+PROBE: NEGATIVE
SODIUM SERPL-SCNC: 141 MMOL/L (ref 136–145)
SP GR UR STRIP: 1.02
TSH SERPL-ACNC: 1.84 UIU/ML (ref 0.34–5.6)
UROBILINOGEN UR STRIP-ACNC: NEGATIVE EU/DL
WBC # BLD AUTO: 5.07 K/UL (ref 3.9–12.7)

## 2025-03-30 PROCEDURE — 87502 INFLUENZA DNA AMP PROBE: CPT | Performed by: EMERGENCY MEDICINE

## 2025-03-30 PROCEDURE — 63600175 PHARM REV CODE 636 W HCPCS: Performed by: EMERGENCY MEDICINE

## 2025-03-30 PROCEDURE — 99285 EMERGENCY DEPT VISIT HI MDM: CPT | Mod: 25

## 2025-03-30 PROCEDURE — 87040 BLOOD CULTURE FOR BACTERIA: CPT | Performed by: EMERGENCY MEDICINE

## 2025-03-30 PROCEDURE — 96375 TX/PRO/DX INJ NEW DRUG ADDON: CPT

## 2025-03-30 PROCEDURE — 84443 ASSAY THYROID STIM HORMONE: CPT | Performed by: EMERGENCY MEDICINE

## 2025-03-30 PROCEDURE — 36415 COLL VENOUS BLD VENIPUNCTURE: CPT | Performed by: EMERGENCY MEDICINE

## 2025-03-30 PROCEDURE — 96374 THER/PROPH/DIAG INJ IV PUSH: CPT

## 2025-03-30 PROCEDURE — 82550 ASSAY OF CK (CPK): CPT | Performed by: EMERGENCY MEDICINE

## 2025-03-30 PROCEDURE — 81003 URINALYSIS AUTO W/O SCOPE: CPT | Performed by: EMERGENCY MEDICINE

## 2025-03-30 PROCEDURE — 25000003 PHARM REV CODE 250: Performed by: EMERGENCY MEDICINE

## 2025-03-30 PROCEDURE — 96361 HYDRATE IV INFUSION ADD-ON: CPT

## 2025-03-30 PROCEDURE — 80053 COMPREHEN METABOLIC PANEL: CPT | Performed by: EMERGENCY MEDICINE

## 2025-03-30 PROCEDURE — 83735 ASSAY OF MAGNESIUM: CPT | Performed by: EMERGENCY MEDICINE

## 2025-03-30 PROCEDURE — U0002 COVID-19 LAB TEST NON-CDC: HCPCS | Performed by: EMERGENCY MEDICINE

## 2025-03-30 PROCEDURE — 85025 COMPLETE CBC W/AUTO DIFF WBC: CPT | Performed by: EMERGENCY MEDICINE

## 2025-03-30 RX ORDER — ONDANSETRON 4 MG/1
4 TABLET, ORALLY DISINTEGRATING ORAL EVERY 6 HOURS PRN
Qty: 9 TABLET | Refills: 0 | Status: SHIPPED | OUTPATIENT
Start: 2025-03-30

## 2025-03-30 RX ORDER — DEXAMETHASONE SODIUM PHOSPHATE 4 MG/ML
8 INJECTION, SOLUTION INTRA-ARTICULAR; INTRALESIONAL; INTRAMUSCULAR; INTRAVENOUS; SOFT TISSUE
Status: COMPLETED | OUTPATIENT
Start: 2025-03-30 | End: 2025-03-30

## 2025-03-30 RX ORDER — CEFTRIAXONE 1 G/1
1 INJECTION, POWDER, FOR SOLUTION INTRAMUSCULAR; INTRAVENOUS
Status: COMPLETED | OUTPATIENT
Start: 2025-03-30 | End: 2025-03-30

## 2025-03-30 RX ORDER — KETOROLAC TROMETHAMINE 30 MG/ML
15 INJECTION, SOLUTION INTRAMUSCULAR; INTRAVENOUS
Status: COMPLETED | OUTPATIENT
Start: 2025-03-30 | End: 2025-03-30

## 2025-03-30 RX ADMIN — DEXAMETHASONE SODIUM PHOSPHATE 8 MG: 4 INJECTION INTRA-ARTICULAR; INTRALESIONAL; INTRAMUSCULAR; INTRAVENOUS; SOFT TISSUE at 12:03

## 2025-03-30 RX ADMIN — SODIUM CHLORIDE 1000 ML: 9 INJECTION, SOLUTION INTRAVENOUS at 10:03

## 2025-03-30 RX ADMIN — KETOROLAC TROMETHAMINE 15 MG: 30 INJECTION, SOLUTION INTRAMUSCULAR; INTRAVENOUS at 10:03

## 2025-03-30 RX ADMIN — CEFTRIAXONE 1 G: 1 INJECTION, POWDER, FOR SOLUTION INTRAMUSCULAR; INTRAVENOUS at 10:03

## 2025-03-30 NOTE — ED PROVIDER NOTES
"Encounter Date: 3/30/2025       History     Chief Complaint   Patient presents with    Headache     Headache x3 wks.  Been to PCP; given medication but "is not helping"       54-year-old female with recurrent sinusitis, under the care of primary care/ENT/Infectious Disease who presents due to a constant diffuse headache over the past 3 days.  The headache is consistent with previous headaches associated with her sinuses although has been more consistent is also having some increased ear pain and pressure to the right ear.  She has had continuous postnasal drainage as well.  Patient states when she has had symptoms of this nature Rocephin has helped her in the past.  She is currently on Avelox and doxycycline which was initiated by her physician several days ago.  Has had some anterior neck soreness but no meningismus.  No photophobia or visual changes.  Headache has been gradual in onset not sudden.  Denies any associated photophobia visual changes or any focal weakness numbness tingling or paresthesias.  Her appetite has been somewhat decreased.  She has not had fever.  She denies cough abdominal pain or vomiting but has had some mild nausea.  Denies chest pain or shortness of breath.  Denies any other problems or complaints.        Review of patient's allergies indicates:   Allergen Reactions    Codeine Nausea And Vomiting    Sulfa (sulfonamide antibiotics)      Facial swelling       Past Medical History:   Diagnosis Date    Dental abscess 2018    Depression     GERD (gastroesophageal reflux disease)     Sinusitis, chronic      Past Surgical History:   Procedure Laterality Date    SINUS SURGERY  1996    SINUS SURGERY  2015    Dr. Buck    TUBAL LIGATION       No family history on file.  Social History[1]  Review of Systems   Constitutional:  Positive for activity change, appetite change and fatigue. Negative for chills and fever.   HENT:  Positive for congestion, ear pain, facial swelling, postnasal drip, " rhinorrhea, sinus pressure and sinus pain. Negative for sore throat and trouble swallowing.    Eyes: Negative.  Negative for photophobia, pain and visual disturbance.   Respiratory: Negative.  Negative for cough and shortness of breath.    Cardiovascular: Negative.  Negative for chest pain.   Gastrointestinal: Negative.  Negative for abdominal pain, constipation, diarrhea, nausea and vomiting.   Endocrine: Negative.    Genitourinary: Negative.  Negative for decreased urine volume, difficulty urinating, dysuria, flank pain, frequency and urgency.   Musculoskeletal: Negative.  Negative for arthralgias, back pain, myalgias and neck stiffness.   Skin: Negative.  Negative for rash.   Neurological:  Positive for headaches. Negative for dizziness, syncope, facial asymmetry, speech difficulty, weakness, light-headedness and numbness.   Hematological:  Does not bruise/bleed easily.   Psychiatric/Behavioral: Negative.  Negative for confusion.    All other systems reviewed and are negative.      Physical Exam     Initial Vitals [03/30/25 0956]   BP Pulse Resp Temp SpO2   (!) 147/88 90 18 98.2 °F (36.8 °C) 99 %      MAP       --         Physical Exam    Nursing note and vitals reviewed.  Constitutional: She is cooperative. She does not appear ill. No distress.   HENT:   Head: Normocephalic and atraumatic.   Right Ear: No mastoid tenderness. Tympanic membrane is not injected. A middle ear effusion is present. No hemotympanum.   Left Ear: No mastoid tenderness. Tympanic membrane is not injected. A middle ear effusion is present. No hemotympanum.   Nose: Nose normal. Mouth/Throat: Uvula is midline, oropharynx is clear and moist and mucous membranes are normal. No oropharyngeal exudate, posterior oropharyngeal edema or posterior oropharyngeal erythema.   Purulent-appearing postnasal drip visualized in the posterior pharynx   Eyes: Conjunctivae, EOM and lids are normal. Pupils are equal, round, and reactive to light. No scleral  icterus.   Neck: Trachea normal and phonation normal. Neck supple. No stridor present. No JVD present.   Normal range of motion.   Full passive range of motion without pain.     Cardiovascular:  Normal rate, regular rhythm, normal heart sounds, intact distal pulses and normal pulses.     Exam reveals no gallop, no distant heart sounds, no friction rub and no decreased pulses.       No murmur heard.  Pulmonary/Chest: Effort normal and breath sounds normal. No respiratory distress. She has no wheezes. She has no rhonchi. She has no rales.   Abdominal: Abdomen is soft. Bowel sounds are normal. She exhibits no distension and no mass. There is no abdominal tenderness.   No right CVA tenderness.  No left CVA tenderness. There is no rigidity.   Musculoskeletal:         General: No tenderness or edema. Normal range of motion.      Right hand: Normal. Normal capillary refill. Normal pulse.      Left hand: Normal. Normal sensation. Normal capillary refill. Normal pulse.      Cervical back: Normal, full passive range of motion without pain, normal range of motion and neck supple. No bony tenderness. No pain with movement or spinous process tenderness. Normal range of motion.      Thoracic back: Normal. No tenderness. Normal range of motion.      Lumbar back: Normal. No tenderness. Normal range of motion.      Right lower leg: Normal. No swelling or tenderness.      Left lower leg: Normal. No swelling or tenderness.      Right foot: Normal. Normal capillary refill. Normal pulse.      Left foot: Normal. Normal capillary refill. Normal pulse.      Comments: Pulses are 2+ throughout, cap refill is less than 2 sec throughout, extremities are nontender throughout with full range of motion. There is no spinal tenderness to palpation.     Neurological: She is alert and oriented to person, place, and time. She has normal strength. No cranial nerve deficit or sensory deficit. Coordination normal.   No focal deficits.   Skin: Skin is  warm, dry and intact. Capillary refill takes less than 2 seconds. No ecchymosis, no petechiae and no rash noted. No erythema. No pallor.   Psychiatric: She has a normal mood and affect. Her speech is normal and behavior is normal.         ED Course   Procedures  Labs Reviewed   COMPREHENSIVE METABOLIC PANEL - Abnormal       Result Value    Sodium 141      Potassium 3.7      Chloride 105      CO2 28      Glucose 80      BUN 13      Creatinine 0.9      Calcium 9.2      Protein Total 7.0      Albumin 4.4      Bilirubin Total 0.4      ALP 45 (*)     AST 15      ALT 15      Anion Gap 8      eGFR >60     CBC WITH DIFFERENTIAL - Abnormal    WBC 5.07      RBC 4.50      Hgb 13.9      Hct 41.3      MCV 92      MCH 30.9      MCHC 33.7      RDW 12.4      Platelet Count 324      MPV 8.8 (*)     Nucleated RBC 0      Neut % 40.8      Lymph % 43.6      Mono % 13.2      Eos % 1.6      Basophil % 0.6      Imm Grans % 0.2      Neut # 2.1      Lymph # 2.21      Mono # 0.67      Eos # 0.08      Baso # 0.03      Imm Grans # 0.01     INFLUENZA A & B BY MOLECULAR - Normal    INFLUENZA A MOLECULAR Negative      INFLUENZA B MOLECULAR  Negative     CULTURE, BLOOD - Normal    CULTURE, BLOOD ([facility]) No Growth After 6 Hours     URINALYSIS, REFLEX TO URINE CULTURE - Normal    Color, UA Yellow      Appearance, UA Clear      Spec Grav UA 1.025      pH, UA 6.0      Protein, UA Negative      Glucose, UA Negative      Ketones, UA Negative      Blood, UA Negative      Bilirubin, UA Negative      Urobilinogen, UA Negative      Nitrites, UA Negative      Leukocyte Esterase, UA Negative     CK - Normal    CPK 61     MAGNESIUM - Normal    Magnesium 2.1     TSH - Normal    TSH 1.839     SARS-COV-2 RNA AMPLIFICATION, QUAL - Normal    SARS COV-2 Molecular Negative     CBC W/ AUTO DIFFERENTIAL    Narrative:     The following orders were created for panel order CBC auto differential.  Procedure                               Abnormality         Status                      ---------                               -----------         ------                     CBC with Differential[[id number]]       Abnormal            Final result                 Please view results for these tests on the individual orders.   ISTAT LACTATE    POC Lactate 0.88      Sample VENOUS            Imaging Results              CT Head Without Contrast (Final result)  Result time 03/30/25 12:27:35      Final result by Kyler Tiwari MD (03/30/25 12:27:35)                   Impression:      No CT evidence of acute intracranial pathology.      Electronically signed by: Kyler Tiwari  Date:    03/30/2025  Time:    12:27               Narrative:    EXAMINATION:  CT HEAD WITHOUT CONTRAST    CLINICAL HISTORY:  Headache, new or worsening (Age >= 50y);    TECHNIQUE:  Axial CT imaging obtained through the brain without IV contrast.    CMS Mandated Quality Data-CT Radiation Dose-436    All CT scans at this facility dose modulation, iterative reconstruction, and or weight-based dosing when appropriate to reduce radiation dose to as low as reasonably achievable.    COMPARISON:  None    FINDINGS:  Negative for acute intracranial hemorrhage, midline shift, or mass effect.  Ventricles and sulci are normal in size.  Small rounded foci of hypoattenuation involving right basal ganglia likely represent prominent Virchow Vern spaces.  Cooper-white differentiation is maintained.  Cerebellar hemispheres and brainstem are unremarkable.    No calvarial lesion or fracture.  Mastoid air cells are clear.  Visualized paranasal sinuses are clear.                                       Medications   sodium chloride 0.9% bolus 1,000 mL 1,000 mL (0 mLs Intravenous Stopped 3/30/25 1155)   ketorolac injection 15 mg (15 mg Intravenous Given 3/30/25 1055)   cefTRIAXone injection 1 g (1 g Intravenous Given 3/30/25 1056)   dexAMETHasone injection 8 mg (8 mg Intravenous Given 3/30/25 1257)     Medical Decision Making  Labs and diagnostic  imaging have been reviewed.  Patient's headache has improved.  Lactic acid is not elevated, white blood cell count is not elevated.  She has been given a dose of Decadron as well as Rocephin in the emergency room.  She feels ready to go home.  Symptomatic supportive care discussed, return precautions discussed in detail and strict importance of close outpatient evaluation with primary care, ENT has been discussed.  Have also recommended repeat blood pressure within the next 24 hours with her physician due to elevated blood pressure in the emergency room.  Patient voices understanding and feels comfortable with discharge planning.    Amount and/or Complexity of Data Reviewed  Labs: ordered. Decision-making details documented in ED Course.  Radiology: ordered.    Risk  Prescription drug management.               ED Course as of 03/30/25 1912   Sun Mar 30, 2025   1245 SARS COV-2 MOLECULAR: Negative [AR]   1245 Magnesium : 2.1 [AR]   1245 TSH: 1.839 [AR]   1245 POC Lactate: 0.88 [AR]   1245 CPK: 61 [AR]   1245 WBC: 5.07 [AR]   1245 Hemoglobin: 13.9 [AR]   1245 Hematocrit: 41.3 [AR]   1245 Sodium: 141 [AR]   1245 Potassium: 3.7 [AR]   1245 Chloride: 105 [AR]   1245 Glucose: 80 [AR]   1245 BUN: 13 [AR]   1245 Creatinine: 0.9 [AR]   1245 Calcium: 9.2 [AR]   1245 BILIRUBIN TOTAL: 0.4 [AR]   1245 ALP(!): 45 [AR]   1245 AST: 15 [AR]   1245 ALT: 15 [AR]   1245 Leukocyte Esterase, UA: Negative [AR]   1245 Influenza B, Molecular: Negative [AR]   1245 Influenza A, Molecular: Negative [AR]   1245 CPK: 61 [AR]   1245 POC Lactate: 0.88 [AR]   1245 WBC: 5.07 [AR]   1245 Hemoglobin: 13.9 [AR]   1245 Hematocrit: 41.3 [AR]   1245 Platelet Count: 324 [AR]      ED Course User Index  [AR] Gretel Toro MD                           Clinical Impression:  Final diagnoses:  [R51.9] Sinus headache (Primary)  [J32.9] Sinusitis, unspecified chronicity, unspecified location          ED Disposition Condition    Discharge Stable          ED  Prescriptions       Medication Sig Dispense Start Date End Date Auth. Provider    ondansetron (ZOFRAN-ODT) 4 MG TbDL Take 1 tablet (4 mg total) by mouth every 6 (six) hours as needed (nausea or vomiting). 9 tablet 3/30/2025 -- Gretel Toro MD          Follow-up Information       Follow up With Specialties Details Why Contact Info    Your primary care doctor and ENT doctor  In 2 days                   [1]   Social History  Tobacco Use    Smoking status: Never    Smokeless tobacco: Never   Substance Use Topics    Alcohol use: No    Drug use: No        Gretel Toro MD  03/30/25 8212

## 2025-03-30 NOTE — DISCHARGE INSTRUCTIONS
Please read and follow discharge instructions and return precautions.  Rest, avoid any strenuous activity, over exertion or overheating.  Keep well hydrated.  Alternate water and Pedialyte for hydration.  Important follow up closely outpatient with your primary care provider as well as your ENT physician in the next 1-2 days.  Return immediately if you develop new or worsening symptoms or if you have new problems or concerns.

## 2025-04-04 LAB — BACTERIA BLD CULT: NORMAL

## 2025-04-16 ENCOUNTER — PATIENT MESSAGE (OUTPATIENT)
Dept: INFECTIOUS DISEASES | Facility: CLINIC | Age: 55
End: 2025-04-16
Payer: COMMERCIAL

## 2025-04-17 NOTE — TELEPHONE ENCOUNTER
I spoke with patient who stated she has 2 Moxifloxacin left and Doxyclycline .  She had stopped   When taking the Rocephin.   She is still blowing her nose and congested   she denies fever.  GIL Rai [facility]  4/17/25

## 2025-06-05 ENCOUNTER — OFFICE VISIT (OUTPATIENT)
Dept: INFECTIOUS DISEASES | Facility: CLINIC | Age: 55
End: 2025-06-05
Payer: COMMERCIAL

## 2025-06-05 VITALS
HEIGHT: 62 IN | WEIGHT: 123.13 LBS | TEMPERATURE: 97 F | DIASTOLIC BLOOD PRESSURE: 68 MMHG | SYSTOLIC BLOOD PRESSURE: 120 MMHG | OXYGEN SATURATION: 96 % | BODY MASS INDEX: 22.66 KG/M2

## 2025-06-05 DIAGNOSIS — J32.1 CHRONIC FRONTAL SINUSITIS: Primary | ICD-10-CM

## 2025-06-05 DIAGNOSIS — Z87.09 HISTORY OF SINUSITIS: ICD-10-CM

## 2025-06-05 DIAGNOSIS — G43.801 OTHER MIGRAINE WITH STATUS MIGRAINOSUS, NOT INTRACTABLE: ICD-10-CM
